# Patient Record
Sex: FEMALE | Race: OTHER | NOT HISPANIC OR LATINO | Employment: FULL TIME | URBAN - METROPOLITAN AREA
[De-identification: names, ages, dates, MRNs, and addresses within clinical notes are randomized per-mention and may not be internally consistent; named-entity substitution may affect disease eponyms.]

---

## 2024-01-17 ENCOUNTER — HOSPITAL ENCOUNTER (EMERGENCY)
Facility: HOSPITAL | Age: 34
Discharge: HOME | End: 2024-01-17
Payer: COMMERCIAL

## 2024-01-17 VITALS
HEART RATE: 86 BPM | OXYGEN SATURATION: 97 % | DIASTOLIC BLOOD PRESSURE: 63 MMHG | RESPIRATION RATE: 18 BRPM | SYSTOLIC BLOOD PRESSURE: 103 MMHG | TEMPERATURE: 97.7 F | WEIGHT: 130 LBS | BODY MASS INDEX: 23.92 KG/M2 | HEIGHT: 62 IN

## 2024-01-17 DIAGNOSIS — B30.9 ACUTE VIRAL CONJUNCTIVITIS OF RIGHT EYE: Primary | ICD-10-CM

## 2024-01-17 PROCEDURE — 2500000001 HC RX 250 WO HCPCS SELF ADMINISTERED DRUGS (ALT 637 FOR MEDICARE OP): Performed by: NURSE PRACTITIONER

## 2024-01-17 PROCEDURE — 99283 EMERGENCY DEPT VISIT LOW MDM: CPT

## 2024-01-17 RX ORDER — TETRACAINE HYDROCHLORIDE 5 MG/ML
2 SOLUTION OPHTHALMIC ONCE
Status: COMPLETED | OUTPATIENT
Start: 2024-01-17 | End: 2024-01-17

## 2024-01-17 RX ORDER — POLYMYXIN B SULFATE AND TRIMETHOPRIM 1; 10000 MG/ML; [USP'U]/ML
1 SOLUTION OPHTHALMIC EVERY 4 HOURS
Qty: 10 ML | Refills: 0 | Status: SHIPPED | OUTPATIENT
Start: 2024-01-17 | End: 2024-01-24

## 2024-01-17 RX ADMIN — FLUORESCEIN SODIUM 1 STRIP: 1 STRIP OPHTHALMIC at 01:43

## 2024-01-17 RX ADMIN — TETRACAINE HYDROCHLORIDE 2 DROP: 5 SOLUTION OPHTHALMIC at 01:43

## 2024-01-17 ASSESSMENT — VISUAL ACUITY: OU: 1

## 2024-01-17 ASSESSMENT — PAIN - FUNCTIONAL ASSESSMENT
PAIN_FUNCTIONAL_ASSESSMENT: 0-10
PAIN_FUNCTIONAL_ASSESSMENT: 0-10

## 2024-01-17 ASSESSMENT — COLUMBIA-SUICIDE SEVERITY RATING SCALE - C-SSRS
1. IN THE PAST MONTH, HAVE YOU WISHED YOU WERE DEAD OR WISHED YOU COULD GO TO SLEEP AND NOT WAKE UP?: NO
2. HAVE YOU ACTUALLY HAD ANY THOUGHTS OF KILLING YOURSELF?: NO
6. HAVE YOU EVER DONE ANYTHING, STARTED TO DO ANYTHING, OR PREPARED TO DO ANYTHING TO END YOUR LIFE?: NO

## 2024-01-17 ASSESSMENT — PAIN DESCRIPTION - PAIN TYPE: TYPE: ACUTE PAIN

## 2024-01-17 ASSESSMENT — PAIN SCALES - GENERAL
PAINLEVEL_OUTOF10: 0 - NO PAIN
PAINLEVEL_OUTOF10: 0 - NO PAIN

## 2024-01-17 ASSESSMENT — LIFESTYLE VARIABLES
HAVE YOU EVER FELT YOU SHOULD CUT DOWN ON YOUR DRINKING: NO
REASON UNABLE TO ASSESS: YES
EVER FELT BAD OR GUILTY ABOUT YOUR DRINKING: NO
HAVE PEOPLE ANNOYED YOU BY CRITICIZING YOUR DRINKING: NO
EVER HAD A DRINK FIRST THING IN THE MORNING TO STEADY YOUR NERVES TO GET RID OF A HANGOVER: NO

## 2024-01-17 NOTE — ED NOTES
Visual acuity    with glasses both eyes  20/10   With only good eye 20/50   With only bad eye 20/50   With both eyes no glasses 20/30      Marah Minor RN  01/17/24 0202

## 2024-01-17 NOTE — Clinical Note
Raffaele Cook was seen and treated in our emergency department on 1/17/2024.  She may return to work on 01/19/2024.       If you have any questions or concerns, please don't hesitate to call.      Cameron Benavides, APRN-CNP

## 2024-01-17 NOTE — ED PROVIDER NOTES
HPI   Chief Complaint   Patient presents with    Eye Problem     Ptstates that she woke up tonight and right eye was red and had yellow drainage       Patient is a healthy nontoxic-appearing 33-year-old female with no past medical history, presents to the emergency today for complaint of right eye irritation and drainage.  Patient states she woke today with reddened with yellow discharge.  Patient denies any injuries trauma or falls, visual changes, headache pain, numbness or tingling, ear pain and sore throat.  Patient states she was diagnosed with COVID-19 1 week ago but does feel recovered from this.  Patient denies any contact use.  Patient denies any itching or pain to the right eye.  Patient denies any irritation to the left eye.  Patient denies any shortness of breath difficulty breathing, chest pain, abdominal pain with nausea or vomiting, fever, shaking, or chills.                          [place] Coma Scale Score: 15                  Patient History   History reviewed. No pertinent past medical history.  History reviewed. No pertinent surgical history.  No family history on file.  Social History     Tobacco Use    Smoking status: Not on file    Smokeless tobacco: Not on file   Substance Use Topics    Alcohol use: Not on file    Drug use: Not on file       Physical Exam   ED Triage Vitals [01/17/24 0047]   Temp Heart Rate Resp BP   36.5 °C (97.7 °F) 85 18 110/66      SpO2 Temp src Heart Rate Source Patient Position   99 % -- -- --      BP Location FiO2 (%)     -- --       Physical Exam  Vitals and nursing note reviewed.   Constitutional:       General: She is not in acute distress.     Appearance: Normal appearance. She is well-developed and normal weight. She is not ill-appearing, toxic-appearing or diaphoretic.   HENT:      Head: Normocephalic and atraumatic.      Right Ear: Tympanic membrane, ear canal and external ear normal.      Left Ear: Tympanic membrane, ear canal and external ear normal.       Nose: No congestion or rhinorrhea.      Mouth/Throat:      Mouth: Mucous membranes are moist.      Pharynx: No oropharyngeal exudate or posterior oropharyngeal erythema.   Eyes:      General: Lids are normal. Vision grossly intact. Gaze aligned appropriately. No allergic shiner, visual field deficit or scleral icterus.        Right eye: Discharge present. No foreign body or hordeolum.         Left eye: No foreign body, discharge or hordeolum.      Extraocular Movements: Extraocular movements intact.      Right eye: Normal extraocular motion and no nystagmus.      Left eye: Normal extraocular motion and no nystagmus.      Conjunctiva/sclera:      Right eye: Right conjunctiva is injected. No chemosis, exudate or hemorrhage.     Left eye: Left conjunctiva is not injected. No chemosis, exudate or hemorrhage.     Pupils: Pupils are equal, round, and reactive to light.      Right eye: Pupil is round, reactive and not sluggish. No corneal abrasion or fluorescein uptake. Valdemar exam negative.      Slit lamp exam:     Right eye: No corneal flare, corneal ulcer, foreign body, hyphema, hypopyon, anterior chamber bulge, anterior chamber flares or photophobia.      Comments: Right sclera is red and injected, left sclera is clear without any redness or injection, no orbital erythema or edema present, pupils equal active round bilaterally, Red light reflex is intact, EOMs intact bilaterally no nystagmus or diplopia.   Neck:      Vascular: No carotid bruit.   Cardiovascular:      Rate and Rhythm: Normal rate and regular rhythm.      Pulses: Normal pulses.      Heart sounds: Normal heart sounds. No murmur heard.     No friction rub. No gallop.   Pulmonary:      Effort: Pulmonary effort is normal. No respiratory distress.      Breath sounds: Normal breath sounds. No stridor. No wheezing, rhonchi or rales.   Chest:      Chest wall: No tenderness.   Musculoskeletal:         General: No swelling. Normal range of motion.      Cervical  back: Normal range of motion and neck supple. No rigidity or tenderness.   Lymphadenopathy:      Cervical: No cervical adenopathy.   Skin:     General: Skin is warm and dry.      Capillary Refill: Capillary refill takes less than 2 seconds.   Neurological:      Mental Status: She is alert.         ED Course & MDM   Diagnoses as of 01/17/24 0204   Acute viral conjunctivitis of right eye       Medical Decision Making  Given patient's complaint presentation a thorough exam was performed.  Patient has Right sclera is red and injected, left sclera is clear without any redness or injection, no orbital erythema or edema present, pupils equal active round bilaterally, Red light reflex is intact, EOMs intact bilaterally no nystagmus or diplopia.  Patient denies any contact use.  Visual acuity was performed and reveals 20/50 to the right, 20/50 to the left, 20/30 bilaterally with corrective lenses.  Eye exam was performed utilizing tetracaine, fluorescein and slit-lamp.  Eye exam reveals no corneal abrasions, no dendritic lesions, no retained foreign body, negative Valdemar sign.  Patient christy neurologically intact during emergency evaluation, remains hemodynamic stable during emergency evaluation, I have a low suspicion for acute intracranial process, meningitis, orbital cellulitis, periorbital cellulitis, open globe injury.  Given findings I suspect patient is experiencing viral conjunctivitis.  Patient received prescription for Polytrim ophthalmic solution.  Trial encourage patient monitor symptoms and if they become worse return to emergency room for further evaluation, otherwise follow-up with primary care provider as needed.  Patient was agreeable with this plan and discharged home in stable condition.    GIA Craig     Portions of this note were generated using digital voice recognition software, and may contain grammatical errors    Procedure  Procedures     GIA Craig  01/17/24 0204

## 2024-01-27 ENCOUNTER — HOSPITAL ENCOUNTER (EMERGENCY)
Facility: HOSPITAL | Age: 34
Discharge: HOME | End: 2024-01-27
Payer: COMMERCIAL

## 2024-01-27 VITALS
BODY MASS INDEX: 23.92 KG/M2 | TEMPERATURE: 97.3 F | SYSTOLIC BLOOD PRESSURE: 100 MMHG | WEIGHT: 130 LBS | RESPIRATION RATE: 16 BRPM | OXYGEN SATURATION: 98 % | DIASTOLIC BLOOD PRESSURE: 63 MMHG | HEART RATE: 90 BPM | HEIGHT: 62 IN

## 2024-01-27 DIAGNOSIS — R09.81 NASAL CONGESTION: ICD-10-CM

## 2024-01-27 DIAGNOSIS — J10.1 INFLUENZA A: Primary | ICD-10-CM

## 2024-01-27 LAB
FLUAV RNA RESP QL NAA+PROBE: DETECTED
FLUBV RNA RESP QL NAA+PROBE: NOT DETECTED
RSV RNA RESP QL NAA+PROBE: NOT DETECTED
SARS-COV-2 RNA RESP QL NAA+PROBE: NOT DETECTED

## 2024-01-27 PROCEDURE — 99283 EMERGENCY DEPT VISIT LOW MDM: CPT

## 2024-01-27 PROCEDURE — 99284 EMERGENCY DEPT VISIT MOD MDM: CPT

## 2024-01-27 PROCEDURE — 87637 SARSCOV2&INF A&B&RSV AMP PRB: CPT

## 2024-01-27 RX ORDER — FLUTICASONE PROPIONATE 50 MCG
1 SPRAY, SUSPENSION (ML) NASAL DAILY
Qty: 16 G | Refills: 0 | Status: SHIPPED | OUTPATIENT
Start: 2024-01-27 | End: 2024-02-26

## 2024-01-27 RX ORDER — ACETAMINOPHEN 325 MG/1
650 TABLET ORAL ONCE
Status: COMPLETED | OUTPATIENT
Start: 2024-01-27 | End: 2024-01-27

## 2024-01-27 RX ADMIN — ACETAMINOPHEN 650 MG: 325 TABLET ORAL at 09:27

## 2024-01-27 NOTE — DISCHARGE INSTRUCTIONS
You were seen in the emergency room today and diagnosed with influenza A.  If you develop a fever, it is okay for you to take Tylenol.  I just recommend not taking the medication in excess. You were also given a prescription for flonase. Reasons to return to emergency room include chest pain, shortness breath, abdominal pain. Please follow up with your PCP and OBGYN.

## 2024-01-27 NOTE — ED PROVIDER NOTES
HPI   Chief Complaint   Patient presents with    Flu Symptoms       Cathleen is a 33-year-old female at 6 months gestation presenting to the emergency room with flu-like symptoms.  Her symptoms started on Monday, 1/22/2024 with a dry cough and nasal congestion.  Over the past week, she has developed chills and admits to a fever of 101 °F several days ago.  She was able to get the fever to go down without taking any medication.  Today, she continues to have a cough that is nonproductive, nasal congestion, and overall fatigue.  Denies any chest pain or shortness of breath.  Patient has had decrease in her appetite. She is also having bilateral ear fullness without any changes in auditory acuity. No abdominal pain, nausea, vomiting, lightheadedness, dizziness.                          [place] Coma Scale Score: 15                  Patient History   No past medical history on file.  No past surgical history on file.  No family history on file.  Social History     Tobacco Use    Smoking status: Not on file    Smokeless tobacco: Not on file   Substance Use Topics    Alcohol use: Not on file    Drug use: Not on file       Physical Exam   ED Triage Vitals [01/27/24 0846]   Temperature Heart Rate Respirations BP   36.3 °C (97.3 °F) 90 16 100/63      Pulse Ox Temp src Heart Rate Source Patient Position   98 % -- -- --      BP Location FiO2 (%)     -- --       Physical Exam  Constitutional:       Appearance: Normal appearance.      Comments: Pregnant.   HENT:      Ears:      Comments: Bilateral serous effusions.     Mouth/Throat:      Mouth: Mucous membranes are moist.      Pharynx: Oropharynx is clear. No oropharyngeal exudate or posterior oropharyngeal erythema.   Eyes:      Extraocular Movements: Extraocular movements intact.   Cardiovascular:      Rate and Rhythm: Normal rate and regular rhythm.   Pulmonary:      Effort: Pulmonary effort is normal. No respiratory distress.      Breath sounds: Normal breath sounds. No  stridor. No wheezing, rhonchi or rales.   Abdominal:      General: Abdomen is flat.      Palpations: Abdomen is soft.      Comments: Pregnant.   Skin:     General: Skin is warm and dry.   Neurological:      Mental Status: She is alert.         ED Course & MDM   ED Course as of 01/27/24 1031   Sat Jan 27, 2024   1024 Flu A Result(!): Detected [AH]      ED Course User Index  [AH] Nadja Calero PA-C         Diagnoses as of 01/27/24 1031   Influenza A   Nasal congestion       Medical Decision Making  Cathleen is a 33-year-old female presenting to emergency department with flulike symptoms.  She is 6 months pregnant.  No chest pain, shortness of breath, or abdominal pain.    Differentials include COVID, RSV, influenza, other viral illness, etc.  Workup included PCR swabs for COVID/influenza/RSV.  Fetal heart tones were also assessed considering gestational age and they are within normal limits at 140 bpm.  PCR swab POSITIVE for INFLUENZA A.  I discussed results with patient. She can continue with supportive care including adequate rest, hydration, and she may use Tylenol.  It is not recommended that she use it for a long period of time, but she can use it for fever and extreme symptoms.  I will also send her home with a prescription for Flonase for her nasal congestion and ear fullness.  Patient to quarantine for five days. She should follow-up with her OB/GYN and primary care provider.  Reasons to return to emergency room include chest pain or shortness of breath and abdominal pain.  Patient agreeable to plan.        Procedure  Procedures     Nadja Calero PA-C  01/27/24 1034

## 2024-01-27 NOTE — Clinical Note
Raffaele Cerna was seen and treated in our emergency department on 1/27/2024.  She may return to work on 02/01/2024.  Flu A positive     If you have any questions or concerns, please don't hesitate to call.      Nadja Calero PA-C